# Patient Record
Sex: FEMALE | Race: WHITE | NOT HISPANIC OR LATINO | Employment: OTHER | URBAN - METROPOLITAN AREA
[De-identification: names, ages, dates, MRNs, and addresses within clinical notes are randomized per-mention and may not be internally consistent; named-entity substitution may affect disease eponyms.]

---

## 2021-05-24 ENCOUNTER — PRENATAL OFFICE VISIT (OUTPATIENT)
Dept: NURSING | Facility: CLINIC | Age: 31
End: 2021-05-24

## 2021-05-24 VITALS — HEIGHT: 63 IN

## 2021-05-24 DIAGNOSIS — Z34.90 SUPERVISION OF NORMAL PREGNANCY: Primary | ICD-10-CM

## 2021-05-24 PROCEDURE — 99207 PR NO CHARGE NURSE ONLY: CPT

## 2021-05-24 RX ORDER — PRENATAL VIT/IRON FUM/FOLIC AC 27MG-0.8MG
1 TABLET ORAL DAILY
COMMUNITY

## 2021-05-24 SDOH — HEALTH STABILITY: MENTAL HEALTH: HOW OFTEN DO YOU HAVE A DRINK CONTAINING ALCOHOL?: NOT ASKED

## 2021-05-24 SDOH — ECONOMIC STABILITY: TRANSPORTATION INSECURITY
IN THE PAST 12 MONTHS, HAS THE LACK OF TRANSPORTATION KEPT YOU FROM MEDICAL APPOINTMENTS OR FROM GETTING MEDICATIONS?: NO

## 2021-05-24 SDOH — ECONOMIC STABILITY: FOOD INSECURITY: WITHIN THE PAST 12 MONTHS, YOU WORRIED THAT YOUR FOOD WOULD RUN OUT BEFORE YOU GOT MONEY TO BUY MORE.: NEVER TRUE

## 2021-05-24 SDOH — ECONOMIC STABILITY: TRANSPORTATION INSECURITY
IN THE PAST 12 MONTHS, HAS LACK OF TRANSPORTATION KEPT YOU FROM MEETINGS, WORK, OR FROM GETTING THINGS NEEDED FOR DAILY LIVING?: NO

## 2021-05-24 SDOH — HEALTH STABILITY: MENTAL HEALTH: HOW OFTEN DO YOU HAVE 6 OR MORE DRINKS ON ONE OCCASION?: NOT ASKED

## 2021-05-24 SDOH — ECONOMIC STABILITY: INCOME INSECURITY: HOW HARD IS IT FOR YOU TO PAY FOR THE VERY BASICS LIKE FOOD, HOUSING, MEDICAL CARE, AND HEATING?: NOT HARD AT ALL

## 2021-05-24 SDOH — ECONOMIC STABILITY: FOOD INSECURITY: WITHIN THE PAST 12 MONTHS, THE FOOD YOU BOUGHT JUST DIDN'T LAST AND YOU DIDN'T HAVE MONEY TO GET MORE.: NEVER TRUE

## 2021-05-24 SDOH — HEALTH STABILITY: MENTAL HEALTH: HOW MANY STANDARD DRINKS CONTAINING ALCOHOL DO YOU HAVE ON A TYPICAL DAY?: NOT ASKED

## 2021-05-24 NOTE — PROGRESS NOTES
NPN nurse visit done over the phone. Pt will be given NPN folder and book at her upcoming appt.   Discussed optional screening available to assess chromosomal anomalies. Questions answered. Pt advised to call the clinic if she has any questions or concerns related to her pregnancy. Prenatal labs will be obtained at her upcoming appt. New prenatal visit scheduled on 6/21/21 with Dr Brantley..    Attempted to reschedule pt to an  appt sooner, but was unable to get her in on a Monday per her preference.  13w1d by LMP  Records from previous prenatal care in [facility] through [facility].  Last PAP 2017 per [facility]    Patient supplied answers from flow sheet for:  Prenatal OB Questionnaire.  Past Medical History  Have you ever recieved care for your mental health? : No  Have you ever been in a major accident or suffered serious trauma?: No  Within the last year, has anyone hit, slapped, kicked or otherwise hurt you?: No  In the last year, has anyone forced you to have sex when you didn't want to?: No    Past Medical History 2   Have you ever received a blood transfusion?: No  Would you accept a blood transfusion if was medically recommended?: Yes  Does anyone in your home smoke?: (!) Yes(Outside the home)   Is your blood type Rh negative?: Unknown  Have you ever ?: (!) Yes  Have you been hospitalized for a nonsurgical reason excluding normal delivery?: (!) Yes(Siezures as a baby)  Have you ever had an abnormal pap smear?: No    Past Medical History (Continued)  Do you have a history of abnormalities of the uterus?: Unknown  Did your mother take AMY or any other hormones when she was pregnant with you?: No  Do you have any other problems we have not asked about which you feel may be important to this pregnancy?: No    Maggy Gonzalez RN

## 2021-06-13 ENCOUNTER — TELEPHONE (OUTPATIENT)
Dept: OBGYN | Facility: CLINIC | Age: 31
End: 2021-06-13

## 2021-06-13 ENCOUNTER — NURSE TRIAGE (OUTPATIENT)
Dept: NURSING | Facility: CLINIC | Age: 31
End: 2021-06-13

## 2021-06-13 ENCOUNTER — HOSPITAL ENCOUNTER (OUTPATIENT)
Facility: CLINIC | Age: 31
Discharge: HOME | End: 2021-06-13
Attending: FAMILY MEDICINE | Admitting: FAMILY MEDICINE

## 2021-06-13 ENCOUNTER — APPOINTMENT (OUTPATIENT)
Dept: ULTRASOUND IMAGING | Facility: CLINIC | Age: 31
End: 2021-06-13
Attending: FAMILY MEDICINE

## 2021-06-13 VITALS
DIASTOLIC BLOOD PRESSURE: 79 MMHG | SYSTOLIC BLOOD PRESSURE: 121 MMHG | OXYGEN SATURATION: 96 % | TEMPERATURE: 98.8 F | RESPIRATION RATE: 18 BRPM

## 2021-06-13 DIAGNOSIS — N39.0 URINARY TRACT INFECTION: Primary | ICD-10-CM

## 2021-06-13 PROBLEM — Z36.89 ENCOUNTER FOR TRIAGE IN PREGNANT PATIENT: Status: ACTIVE | Noted: 2021-06-13

## 2021-06-13 LAB
ALBUMIN UR-MCNC: 70 MG/DL
APPEARANCE UR: ABNORMAL
BACTERIA #/AREA URNS HPF: ABNORMAL /HPF
BILIRUB UR QL STRIP: NEGATIVE
COLOR UR AUTO: ABNORMAL
GLUCOSE UR STRIP-MCNC: NEGATIVE MG/DL
HGB UR QL STRIP: ABNORMAL
KETONES UR STRIP-MCNC: 10 MG/DL
LEUKOCYTE ESTERASE UR QL STRIP: ABNORMAL
MUCOUS THREADS #/AREA URNS LPF: PRESENT /LPF
NITRATE UR QL: NEGATIVE
PH UR STRIP: 5.5 PH (ref 5–7)
RBC #/AREA URNS AUTO: >182 /HPF (ref 0–2)
SOURCE: ABNORMAL
SP GR UR STRIP: 1.03 (ref 1–1.03)
SPECIMEN SOURCE: NORMAL
SQUAMOUS #/AREA URNS AUTO: 7 /HPF (ref 0–1)
UROBILINOGEN UR STRIP-MCNC: NORMAL MG/DL (ref 0–2)
WBC #/AREA URNS AUTO: >182 /HPF (ref 0–5)
WET PREP SPEC: NORMAL

## 2021-06-13 PROCEDURE — 87210 SMEAR WET MOUNT SALINE/INK: CPT | Performed by: FAMILY MEDICINE

## 2021-06-13 PROCEDURE — 81001 URINALYSIS AUTO W/SCOPE: CPT | Performed by: FAMILY MEDICINE

## 2021-06-13 PROCEDURE — 250N000011 HC RX IP 250 OP 636: Performed by: FAMILY MEDICINE

## 2021-06-13 PROCEDURE — 76805 OB US >/= 14 WKS SNGL FETUS: CPT

## 2021-06-13 PROCEDURE — 99203 OFFICE O/P NEW LOW 30 MIN: CPT | Performed by: FAMILY MEDICINE

## 2021-06-13 PROCEDURE — 96372 THER/PROPH/DIAG INJ SC/IM: CPT | Performed by: FAMILY MEDICINE

## 2021-06-13 PROCEDURE — 87086 URINE CULTURE/COLONY COUNT: CPT | Performed by: FAMILY MEDICINE

## 2021-06-13 PROCEDURE — G0463 HOSPITAL OUTPT CLINIC VISIT: HCPCS | Mod: 25

## 2021-06-13 RX ORDER — ACETAMINOPHEN 325 MG/1
650 TABLET ORAL EVERY 4 HOURS PRN
Status: CANCELLED | OUTPATIENT
Start: 2021-06-13

## 2021-06-13 RX ORDER — HYDROMORPHONE HYDROCHLORIDE 1 MG/ML
.3-.5 INJECTION, SOLUTION INTRAMUSCULAR; INTRAVENOUS; SUBCUTANEOUS
Status: CANCELLED | OUTPATIENT
Start: 2021-06-13

## 2021-06-13 RX ORDER — NITROFURANTOIN 25; 75 MG/1; MG/1
100 CAPSULE ORAL 2 TIMES DAILY
Qty: 14 CAPSULE | Refills: 0 | Status: SHIPPED | OUTPATIENT
Start: 2021-06-13 | End: 2021-06-20

## 2021-06-13 RX ORDER — DIPHENOXYLATE HCL/ATROPINE 2.5-.025MG
2 TABLET ORAL EVERY 4 HOURS PRN
Status: CANCELLED | OUTPATIENT
Start: 2021-06-13

## 2021-06-13 RX ORDER — MISOPROSTOL 200 UG/1
400 TABLET ORAL EVERY 4 HOURS PRN
Status: CANCELLED | OUTPATIENT
Start: 2021-06-13

## 2021-06-13 RX ORDER — ONDANSETRON 2 MG/ML
4 INJECTION INTRAMUSCULAR; INTRAVENOUS EVERY 6 HOURS PRN
Status: CANCELLED | OUTPATIENT
Start: 2021-06-13

## 2021-06-13 RX ORDER — DIPHENOXYLATE HCL/ATROPINE 2.5-.025MG
2 TABLET ORAL ONCE
Status: CANCELLED | OUTPATIENT
Start: 2021-06-13 | End: 2021-06-13

## 2021-06-13 RX ORDER — MISOPROSTOL 200 UG/1
600 TABLET ORAL ONCE
Status: CANCELLED | OUTPATIENT
Start: 2021-06-13 | End: 2021-06-13

## 2021-06-13 RX ADMIN — HUMAN RHO(D) IMMUNE GLOBULIN 300 MCG: 300 INJECTION, SOLUTION INTRAMUSCULAR at 22:58

## 2021-06-14 LAB
BACTERIA SPEC CULT: NORMAL
Lab: NORMAL
SPECIMEN SOURCE: NORMAL

## 2021-06-14 NOTE — PROVIDER NOTIFICATION
06/13/21 2025   Provider Notification   Provider Name/Title Dr. Barros   Method of Notification Phone   Request Evaluate - Remote   Notification Reason Patient Arrived;Bleeding   Discuss with MD triage information (see previous note). Doptones done with HR of 120s-134. Pt is also tachycardic so placed pulse ox and palpated radial pulse while listening with doptone. Difficult to access if picking up maternal HR vs fetal HR.    MD orders wet prep, UA, and US.

## 2021-06-14 NOTE — PLAN OF CARE
Data: Patient presented to [facility]: 2021  8:10 PM.  Reason for maternal/fetal assessment is vaginal bleeding. Patient reports going to bathroom around 1900 and noticing mucus with red blood in her underwear and when she wiped. None in the toilet. Denies further bleeding at this time. Reports lower bilat abd soreness for weeks that increases with walking a lot. Denies cramping. Patient is a .  Prenatal record reviewed. Pregnancy has been uncomplicated.  Gestational Age 16w0d. VSS. Fetal movement pt states she thinks she can feel baby occasionally. Patient denies uterine contractions, leaking of vaginal fluid/rupture of membranes, abdominal pain, pelvic pressure, nausea, vomiting, headache, visual disturbances, epigastric or URQ pain, significant edema. Denies recent sex/anything vaginal. Denies falls, trauma to stomach. Support person is not present - FOB is a  who's currently in [place].  Action: Verbal consent for EFM. Triage assessment completed. Bill of rights reviewed.  Response: Patient verbalized agreement with plan. Will contact Dr Shana Barros with update and for further orders.

## 2021-06-14 NOTE — PLAN OF CARE
Patient assessed in the [facility] for vaginal bleeding. Per US no fetal activity noted - opting for induction on Friday. Discharge instructions and induction time reviewed. Orders to discharge home per Shana Barros.  Patient verbalized understanding of education and verbalized agreement with plan. Pt reports she is coping well with the information of a miscarriage. States she has had a miscarriage before and that this was a surprise baby that they weren't expecting. Pt indicates she had just accepted the pregnancy and was happy about it. Discussed normalcy of multiple emotions and to be reaching out to her support system as needed. Call clinic if concerns - pt verbalized and agreed. Discharged to home at 2305 via ride from brother-in-law.

## 2021-06-14 NOTE — TELEPHONE ENCOUNTER
"Patient calling - says she is 15 weeks pregnant.  Is scheduled for OB appointment next Monday.  Today she is having lower abdominal pain and vaginal bleeding.  Symptoms started about 2 hours ago.  Says the vaginal bleeding is moderate \"like a regular period.\"    Triaged to disposition of Go to ED Now.    Miriam Awad RN  Triage Nurse Advisor    COVID 19 Nurse Triage Plan/Patient Instructions    Please be aware that novel coronavirus (COVID-19) may be circulating in the community. If you develop symptoms such as fever, cough, or SOB or if you have concerns about the presence of another infection including coronavirus (COVID-19), please contact your health care provider or visit [email or web address].     Disposition/Instructions    ED Visit recommended. Follow protocol based instructions.     Bring Your Own Device:  Please also bring your smart device(s) (smart phones, tablets, laptops) and their charging cables for your personal use and to communicate with your care team during your visit.    Thank you for taking steps to prevent the spread of this virus.  o Limit your contact with others.  o Wear a simple mask to cover your cough.  o Wash your hands well and often.    Resources    [facility]: About COVID-19: [email or web address]    CDC: What to Do If You're Sick: www.cdc.gov/coronavirus/2019-ncov/about/steps-when-sick.html    CDC: Ending Home Isolation: www.cdc.gov/coronavirus/2019-ncov/hcp/disposition-in-home-patients.html     CDC: Caring for Someone: www.cdc.gov/coronavirus/2019-ncov/if-you-are-sick/care-for-someone.html     [facility]: Interim Guidance for Hospital Discharge to Home: www.health.[facility].[place].us/diseases/coronavirus/hcp/hospdischarge.pdf    [facility] clinical trials (COVID-19 research studies): [email or web address].[email or web address]/umn-clinical-trials     Below are the COVID-19 hotlines at the [place] Department of Health ([facility]). Interpreters are available.   o For health " questions: Call 407-672-1244 or 1-991.733.4710 (7 a.m. to 7 p.m.)  o For questions about schools and childcare: Call 549-185-2066 or 1-179.351.5226 (7 a.m. to 7 p.m.)     Reason for Disposition    [1] MODERATE vaginal bleeding (i.e., soaking 1 pad / hour; clots) AND [2] pregnant > 12 weeks    Additional Information    Negative: Shock suspected (e.g., cold/pale/clammy skin, too weak to stand, low BP, rapid pulse)    Negative: Difficult to awaken or acting confused (e.g., disoriented, slurred speech)    Negative: Passed out (i.e., lost consciousness, collapsed and was not responding)    Negative: Sounds like a life-threatening emergency to the triager    Negative: [1] Vaginal bleeding AND [2] pregnant > 20 weeks    Negative: Not pregnant or pregnancy status unknown    Negative: SEVERE abdominal pain    Negative: [1] SEVERE vaginal bleeding (e.g., soaking 2 pads / hour, large blood clots) AND [2] present 2 or more hours    Negative: SEVERE dizziness (e.g., unable to stand, requires support to walk, feels like passing out)    Negative: [1] MODERATE vaginal bleeding (i.e., soaking 1 pad / hour; clots) AND [2] present > 6 hours    Protocols used: PREGNANCY - VAGINAL BLEEDING LESS THAN 20 WEEKS [facility]

## 2021-06-14 NOTE — DISCHARGE INSTRUCTIONS
- Induction scheduled for Friday the 18th at 3PM. Call [phone] an hour before to verify timing.  - Take temperature daily. Monitor bleeding - come if filling a pad an hour or more OR if large clots noted.  - Urinary Tract Infection - antibiotic sent to [facility] in [place].  - Rhogam given 06/13/21.      Discharge Instructions for Miscarriage   You have had a miscarriage. This is the unplanned end of a pregnancy before the baby can live outside the uterus. You may have had a shock to your system, both physically and emotionally. Because of this, you may not feel well for a few days. Your body is going through changes. And you can expect mood swings. When you are ready, start back to your normal routine.  Home care  Suggestions for care at home include:    Return to work or your daily routines when you feel ready. This might be right away, or you may want to wait a few days.    Take showers instead of tub baths. This helps prevent infection. Ask your healthcare provider when you can take baths again.    Don't do any strenuous exercise right away, such as aerobics or running. Wait until the bleeding slows to the rate of a normal period.    Don t have sex or use tampons or douches until your provider says it s OK.    Get emotional support. Ask your provider about support groups in your area. Many women find it helpful to talk with other women who have had a miscarriage.  Follow-up  Make a follow-up appointment with your healthcare provider.  When to call your healthcare provider  Call your healthcare provider right away if you have any of the following:    Fever above 100.4 F (38 C) or higher, or as advised by your provider    Chills    Bright red vaginal bleeding or a smelly discharge    Vaginal bleeding that soaks more than 1 menstrual pad per hour    Belly pain that is severe or getting worse  [employer] last reviewed this educational content on 6/1/2019 2000-2021 [employer]. All rights reserved.  This information is not intended as a substitute for professional medical care. Always follow your healthcare professional's instructions.

## 2021-06-14 NOTE — PROVIDER NOTIFICATION
06/13/21 2236   Provider Notification   Provider Name/Title Dr. Barros   Method of Notification In Department   Notification Reason Other (Comment)   Discuss per care everywhere pt's blood type Aneg.     MD orders Rhogam at this time and then let pt know she will get another dose after she's delivered.

## 2021-06-14 NOTE — PROVIDER NOTIFICATION
06/13/21 7123   Provider Notification   Provider Name/Title Dr. Barros   Method of Notification At Bedside   Request Evaluate in Person   Notification Reason Other (Comment)   MD discusses US results with pt and FOB (via phone as he is a  currently in [place]). No fetal heart activity noted on US and fetus measuring around 14wks. Relay to MD that urine sample had medardo blood and wet prep was had blood as well. Small amount of medardo blood on pad.    MD discusses option of D&E vs inducing for labor. After pt discusses with FOB decide to do induction on Friday afternoon.

## 2021-06-14 NOTE — TELEPHONE ENCOUNTER
Alphonse, this patient came in with IUFD @ 14 2/7 weeks and wanted to come back for IOL on Friday when you are on call. I talked to her about IOL and D & E.  She prefers IOL and has had a misoprostol IOL for a 11 week that went as planned.      Dr. Brantley, you  Were going to see her so I wanted to let you know what is going on.  She may see you 6/21 post delivery

## 2021-06-14 NOTE — H&P
[facility] Labor and Delivery Triage Note    Sonya Aguilar MRN# [id number]   Age: 31 year old YOB: 1990     Date of Admission:  2021    Primary care provider: No Ref-Primary, Physician           Chief Complaint:   Sonya Aguilar is a 31 year old female who is  @ 16w0d pregnant and being seen for intrauterine fetal demise, measuring 14 weeks 2 days gestation.  Hx of 11 week Missed , in between two term vaginal delivery.           Pregnancy history:     OBSTETRIC HISTORY:    OB History    Para Term  AB Living   4 2 2 0 1 2   SAB TAB Ectopic Multiple Live Births   1 0 0 0 2      # Outcome Date GA Lbr Cecilio/2nd Weight Sex Delivery Anes PTL Lv   4 Current            3 Term 10/16/19 38w5d  2.7 kg (5 lb 15.2 oz) F Vag-Spont   ZAC      Birth Comments: thick mec->hypoxia->NICU->cooling      Name: BG SONYA AGUILAR      Apgar1: 7  Apgar5: 9   2 SAB 18           1 Term 09/10/15 41w1d   M Vag-Spont EPI  ZAC      Name: Trent WILLS       EDC: Estimated Date of Delivery: 2021    Prenatal Labs: No results found for: ABO, RH, AS, HEPBANG, CHPCRT, GCPCRT, TREPAB, RUBELLAABIGG, HGB, HIV    GBS Status:   No results found for: GBS    Active Problem List  Patient Active Problem List   Diagnosis     Encounter for triage in pregnant patient       Medication Prior to Admission  Medications Prior to Admission   Medication Sig Dispense Refill Last Dose     Prenatal Vit-Fe Fumarate-FA (PRENATAL MULTIVITAMIN W/IRON) 27-0.8 MG tablet Take 1 tablet by mouth daily      .        Maternal Past Medical History:     Past Medical History:   Diagnosis Date     Seizures in the      At 10 months                       Family History:   This patient has no significant family history            Social History:   This patient has no significant social history         Review of Systems:   CONSTITUTIONAL: NEGATIVE for fever, chills, change in weight  INTEGUMENTARY/SKIN:  NEGATIVE for worrisome rashes, moles or lesions  EYES: NEGATIVE for vision changes or irritation  ENT/MOUTH: NEGATIVE for ear, mouth and throat problems  RESP: NEGATIVE for significant cough or SOB  BREAST: NEGATIVE for masses, tenderness or discharge  CV: NEGATIVE for chest pain, palpitations or peripheral edema  GI: NEGATIVE for nausea, abdominal pain, heartburn, or change in bowel habits  : NEGATIVE for frequency, dysuria, or hematuria  MUSCULOSKELETAL: NEGATIVE for significant arthralgias or myalgia  NEURO: NEGATIVE for weakness, dizziness or paresthesias  ENDOCRINE: NEGATIVE for temperature intolerance, skin/hair changes  HEME: NEGATIVE for bleeding problems  PSYCHIATRIC: NEGATIVE for changes in mood or affect          Physical Exam:     Vitals were reviewed  All vitals stable  Patient Vitals for the past 8 hrs:   BP Temp Temp src Resp SpO2   06/13/21 2205 -- 98.8  F (37.1  C) Oral -- --   06/13/21 2016 121/79 99.8  F (37.7  C) Oral 18 96 %     Constitutional: Awake, alert, cooperative, no apparent distress, and appears stated age.    EXAM: US OB > 14 WEEKS  LOCATION: [facility]  DATE/TIME: 6/13/2021 9:00 PM     INDICATION: Pregnant with bleeding.  COMPARISON: None.  TECHNIQUE: Routine.     FINDINGS: Single intrauterine gestation, cephalic presentation. Placenta is located posterior. Amniotic fluid is abnormal subjectively, not measured. Uterus is normal. Maternal adnexa (right and left ovaries) show no abnormalities.     FETAL ANOMALY SCREEN: Not performed.       BIOMETRY:  Biparietal Diameter: 2.29 cm, 13 weeks, 6 days  Head Circumference: 9.15 cm, 14 weeks, 1 day  Abdominal Circumference: 8.2 cm, 14 weeks, 4 days  Femur Length: 1.48 cm, 14 weeks, 3 days     Fetal Heart Rate: No cardiac activity identified.     EDC by This US exam: 12/10/2021     Composite Age by This US: 14 weeks, 2 days                                                                      IMPRESSION:    1.  Single nonviable  intrauterine gestation.  2.  Based on this ultrasound, composite age of 14 weeks 2 days with EDC 12/10/2021.  3.  Clinical EDC 2021        Assessment:   Kayla Loya is a 31 year old female who is  @ 16w0d pregnant and being seen for intrauterine fetal demise, measuring 14 weeks 2 days gestation.  Hx of 11 week Missed , in between two term vaginal delivery.         Plan:   Fetal demise, 14 2/7 weeks gestation:     Discussed options of induction of labor or dilation and evacuation due to > 14 weeks gestation.   She desires IOL and would like to be discharged home and return on Friday since her    Is out of town until then.  This will be arranged.  Discussed risks, benefits and alternatives.   Discussed testing her and baby and she declines this today as she is not thinking she will pursue pregnancy in the future.   I advised that we will discuss again upon admission and she will consider this.      UTI:  rx macrobid     Rh negative:  Had some bleeding today, will give rhogam now and plan for repeat dose on Friday after delivery.      Labs were reviewed in [facility]   Imaging was reviewed in Epic   Tests and documents were reviewed.   Discussion of management or test interpretation       15 minutes spent on the date of the encounter doing chart review, review of test results, interpretation of tests, patient visit, documentation and discussion with family ( on facetime during visit).           Shana Barros,

## 2021-06-18 ENCOUNTER — HOSPITAL ENCOUNTER (INPATIENT)
Facility: CLINIC | Age: 31
LOS: 1 days | Discharge: HOME | DRG: 779 | End: 2021-06-19
Attending: OBSTETRICS & GYNECOLOGY | Admitting: OBSTETRICS & GYNECOLOGY

## 2021-06-18 DIAGNOSIS — O02.1 MISSED ABORTION WITH FETAL DEMISE BEFORE 20 COMPLETED WEEKS OF GESTATION: Primary | ICD-10-CM

## 2021-06-18 LAB
ABO + RH BLD: ABNORMAL
ABO + RH BLD: ABNORMAL
AMPHETAMINES UR QL SCN: NEGATIVE
BLD GP AB INVEST PLASRBC-IMP: ABNORMAL
BLD GP AB SCN SERPL QL: ABNORMAL
BLOOD BANK CMNT PATIENT-IMP: ABNORMAL
CANNABINOIDS UR QL: NEGATIVE
COCAINE UR QL: NEGATIVE
ERYTHROCYTE [DISTWIDTH] IN BLOOD BY AUTOMATED COUNT: 13 % (ref 10–15)
FIBRINOGEN PPP-MCNC: 266 MG/DL (ref 200–420)
HCT VFR BLD AUTO: 41.2 % (ref 35–47)
HGB BLD-MCNC: 13.5 G/DL (ref 11.7–15.7)
HGB F BLD QL KLEIH BETKE: NORMAL
LABORATORY COMMENT REPORT: NORMAL
MCH RBC QN AUTO: 29 PG (ref 26.5–33)
MCHC RBC AUTO-ENTMCNC: 32.8 G/DL (ref 31.5–36.5)
MCV RBC AUTO: 88 FL (ref 78–100)
OPIATES UR QL SCN: NEGATIVE
PCP UR QL SCN: NEGATIVE
PLATELET # BLD AUTO: 201 10E9/L (ref 150–450)
RBC # BLD AUTO: 4.66 10E12/L (ref 3.8–5.2)
SARS-COV-2 RNA RESP QL NAA+PROBE: NEGATIVE
SPECIMEN EXP DATE BLD: ABNORMAL
SPECIMEN SOURCE: NORMAL
WBC # BLD AUTO: 7.7 10E9/L (ref 4–11)

## 2021-06-18 PROCEDURE — 86901 BLOOD TYPING SEROLOGIC RH(D): CPT | Performed by: OBSTETRICS & GYNECOLOGY

## 2021-06-18 PROCEDURE — 10J17ZZ INSPECTION OF PRODUCTS OF CONCEPTION, RETAINED, VIA NATURAL OR ARTIFICIAL OPENING: ICD-10-PCS | Performed by: OBSTETRICS & GYNECOLOGY

## 2021-06-18 PROCEDURE — 99232 SBSQ HOSP IP/OBS MODERATE 35: CPT | Performed by: OBSTETRICS & GYNECOLOGY

## 2021-06-18 PROCEDURE — 87635 SARS-COV-2 COVID-19 AMP PRB: CPT | Performed by: OBSTETRICS & GYNECOLOGY

## 2021-06-18 PROCEDURE — 86870 RBC ANTIBODY IDENTIFICATION: CPT | Performed by: OBSTETRICS & GYNECOLOGY

## 2021-06-18 PROCEDURE — 80307 DRUG TEST PRSMV CHEM ANLYZR: CPT | Performed by: OBSTETRICS & GYNECOLOGY

## 2021-06-18 PROCEDURE — 85384 FIBRINOGEN ACTIVITY: CPT | Performed by: OBSTETRICS & GYNECOLOGY

## 2021-06-18 PROCEDURE — 86780 TREPONEMA PALLIDUM: CPT | Performed by: OBSTETRICS & GYNECOLOGY

## 2021-06-18 PROCEDURE — 36415 COLL VENOUS BLD VENIPUNCTURE: CPT | Performed by: OBSTETRICS & GYNECOLOGY

## 2021-06-18 PROCEDURE — 250N000013 HC RX MED GY IP 250 OP 250 PS 637: Performed by: OBSTETRICS & GYNECOLOGY

## 2021-06-18 PROCEDURE — 86900 BLOOD TYPING SEROLOGIC ABO: CPT | Performed by: OBSTETRICS & GYNECOLOGY

## 2021-06-18 PROCEDURE — 85027 COMPLETE CBC AUTOMATED: CPT | Performed by: OBSTETRICS & GYNECOLOGY

## 2021-06-18 PROCEDURE — 120N000001 HC R&B MED SURG/OB

## 2021-06-18 PROCEDURE — 86850 RBC ANTIBODY SCREEN: CPT | Performed by: OBSTETRICS & GYNECOLOGY

## 2021-06-18 PROCEDURE — 85460 HEMOGLOBIN FETAL: CPT | Performed by: OBSTETRICS & GYNECOLOGY

## 2021-06-18 RX ORDER — DIPHENOXYLATE HCL/ATROPINE 2.5-.025MG
2 TABLET ORAL ONCE
Status: CANCELLED | OUTPATIENT
Start: 2021-06-18 | End: 2021-06-18

## 2021-06-18 RX ORDER — NALBUPHINE HYDROCHLORIDE 10 MG/ML
2.5-5 INJECTION, SOLUTION INTRAMUSCULAR; INTRAVENOUS; SUBCUTANEOUS EVERY 6 HOURS PRN
Status: DISCONTINUED | OUTPATIENT
Start: 2021-06-18 | End: 2021-06-20 | Stop reason: HOSPADM

## 2021-06-18 RX ORDER — TRANEXAMIC ACID 10 MG/ML
INJECTION, SOLUTION INTRAVENOUS
Status: DISPENSED
Start: 2021-06-18 | End: 2021-06-19

## 2021-06-18 RX ORDER — LIDOCAINE HYDROCHLORIDE 10 MG/ML
INJECTION, SOLUTION EPIDURAL; INFILTRATION; INTRACAUDAL; PERINEURAL
Status: DISPENSED
Start: 2021-06-18 | End: 2021-06-19

## 2021-06-18 RX ORDER — SODIUM CHLORIDE, SODIUM LACTATE, POTASSIUM CHLORIDE, CALCIUM CHLORIDE 600; 310; 30; 20 MG/100ML; MG/100ML; MG/100ML; MG/100ML
INJECTION, SOLUTION INTRAVENOUS CONTINUOUS
Status: DISCONTINUED | OUTPATIENT
Start: 2021-06-18 | End: 2021-06-20 | Stop reason: HOSPADM

## 2021-06-18 RX ORDER — NALOXONE HYDROCHLORIDE 0.4 MG/ML
0.4 INJECTION, SOLUTION INTRAMUSCULAR; INTRAVENOUS; SUBCUTANEOUS
Status: DISCONTINUED | OUTPATIENT
Start: 2021-06-18 | End: 2021-06-20 | Stop reason: HOSPADM

## 2021-06-18 RX ORDER — MISOPROSTOL 200 UG/1
600 TABLET ORAL ONCE
Status: COMPLETED | OUTPATIENT
Start: 2021-06-18 | End: 2021-06-18

## 2021-06-18 RX ORDER — MISOPROSTOL 200 UG/1
400 TABLET ORAL EVERY 4 HOURS PRN
Status: CANCELLED | OUTPATIENT
Start: 2021-06-18

## 2021-06-18 RX ORDER — HYDROMORPHONE HYDROCHLORIDE 1 MG/ML
.3-.5 INJECTION, SOLUTION INTRAMUSCULAR; INTRAVENOUS; SUBCUTANEOUS
Status: DISCONTINUED | OUTPATIENT
Start: 2021-06-18 | End: 2021-06-20 | Stop reason: HOSPADM

## 2021-06-18 RX ORDER — ACETAMINOPHEN 325 MG/1
650 TABLET ORAL EVERY 4 HOURS PRN
Status: DISCONTINUED | OUTPATIENT
Start: 2021-06-18 | End: 2021-06-20 | Stop reason: HOSPADM

## 2021-06-18 RX ORDER — ACETAMINOPHEN 325 MG/1
650 TABLET ORAL EVERY 4 HOURS PRN
Status: CANCELLED | OUTPATIENT
Start: 2021-06-18

## 2021-06-18 RX ORDER — EPHEDRINE SULFATE 50 MG/ML
5 INJECTION, SOLUTION INTRAMUSCULAR; INTRAVENOUS; SUBCUTANEOUS
Status: DISCONTINUED | OUTPATIENT
Start: 2021-06-18 | End: 2021-06-20 | Stop reason: HOSPADM

## 2021-06-18 RX ORDER — NALOXONE HYDROCHLORIDE 0.4 MG/ML
0.2 INJECTION, SOLUTION INTRAMUSCULAR; INTRAVENOUS; SUBCUTANEOUS
Status: DISCONTINUED | OUTPATIENT
Start: 2021-06-18 | End: 2021-06-20 | Stop reason: HOSPADM

## 2021-06-18 RX ORDER — MISOPROSTOL 200 UG/1
400 TABLET ORAL EVERY 4 HOURS PRN
Status: DISCONTINUED | OUTPATIENT
Start: 2021-06-18 | End: 2021-06-20 | Stop reason: HOSPADM

## 2021-06-18 RX ORDER — ONDANSETRON 2 MG/ML
4 INJECTION INTRAMUSCULAR; INTRAVENOUS EVERY 6 HOURS PRN
Status: DISCONTINUED | OUTPATIENT
Start: 2021-06-18 | End: 2021-06-20 | Stop reason: HOSPADM

## 2021-06-18 RX ORDER — ONDANSETRON 2 MG/ML
4 INJECTION INTRAMUSCULAR; INTRAVENOUS EVERY 6 HOURS PRN
Status: CANCELLED | OUTPATIENT
Start: 2021-06-18

## 2021-06-18 RX ORDER — ONDANSETRON 4 MG/1
4 TABLET, ORALLY DISINTEGRATING ORAL EVERY 6 HOURS PRN
Status: DISCONTINUED | OUTPATIENT
Start: 2021-06-18 | End: 2021-06-20 | Stop reason: HOSPADM

## 2021-06-18 RX ORDER — MISOPROSTOL 200 UG/1
600 TABLET ORAL ONCE
Status: CANCELLED | OUTPATIENT
Start: 2021-06-18 | End: 2021-06-18

## 2021-06-18 RX ORDER — DIPHENOXYLATE HCL/ATROPINE 2.5-.025MG
2 TABLET ORAL EVERY 4 HOURS PRN
Status: CANCELLED | OUTPATIENT
Start: 2021-06-18

## 2021-06-18 RX ORDER — MORPHINE SULFATE 4 MG/ML
1 INJECTION, SOLUTION INTRAMUSCULAR; INTRAVENOUS
Status: CANCELLED | OUTPATIENT
Start: 2021-06-18

## 2021-06-18 RX ADMIN — MISOPROSTOL 600 MCG: 200 TABLET ORAL at 17:40

## 2021-06-18 RX ADMIN — MISOPROSTOL 400 MCG: 200 TABLET ORAL at 22:00

## 2021-06-18 NOTE — H&P
"L&D History and Physical   2021  Sonya Loya  [id number]      HPI: Sonya Loya is a 31 year old  presents for scheduled induction of labor for a missed  at 14w2d.    She states that she feels well today.  She denies fever, HA, blurred vision, Nausea, vomiting, CP, SOB, abdominal pain, constipation, diarrhea, vaginal bleeding, LOF, abnormal vaginal discharge, and acute swelling.       Complications of Pregnancy:  -   Patient Active Problem List   Diagnosis     Encounter for triage in pregnant patient     Missed  with fetal demise before 20 completed weeks of gestation         OBHX:   OB History    Para Term  AB Living   4 2 2 0 1 2   SAB TAB Ectopic Multiple Live Births   1 0 0 0 2      # Outcome Date GA Lbr Cecilio/2nd Weight Sex Delivery Anes PTL Lv   4 Current            3 Term 10/16/19 38w5d  2.7 kg (5 lb 15.2 oz) F Vag-Spont   ZAC      Birth Comments: thick mec->hypoxia->NICU->cooling      Name: MELONIEMARTÍNEZBG SONYA      Apgar1: 7  Apgar5: 9   2 SAB 18           1 Term 09/10/15 41w1d   M Vag-Spont EPI  ZAC      Name: Trent WILLS       MedicalHX:   Past Medical History:   Diagnosis Date     Seizures in the      At 10 months       SurgicalHX:   Past Surgical History:   Procedure Laterality Date     NO HISTORY OF SURGERY         Medications:   No current facility-administered medications on file prior to encounter.   nitroFURantoin macrocrystal-monohydrate (MACROBID) 100 MG capsule, Take 1 capsule (100 mg) by mouth 2 times daily for 7 days  Prenatal Vit-Fe Fumarate-FA (PRENATAL MULTIVITAMIN W/IRON) 27-0.8 MG tablet, Take 1 tablet by mouth daily        Allergies:  Allergies   Allergen Reactions     Sulfa Drugs      \"This happened when I was a baby and never since.\"       FamilyHX:  Family History   Problem Relation Age of Onset     Hypertension Mother      Hypertension Father      Skin Cancer Paternal Grandfather        SocialHX:   Social History " "    Socioeconomic History     Marital status:      Spouse name: Trent     Number of children: 12     Years of education: None     Highest education level: None   Occupational History     None   Social Needs     Financial resource strain: Not hard at all     Food insecurity     Worry: Never true     Inability: Never true     Transportation needs     Medical: No     Non-medical: No   Tobacco Use     Smoking status: Former Smoker     Quit date:      Years since quittin.4     Smokeless tobacco: Never Used   Substance and Sexual Activity     Alcohol use: Not Currently     Drug use: Never     Sexual activity: Yes     Partners: Male     Comment: Pregnant   Lifestyle     Physical activity     Days per week: None     Minutes per session: None     Stress: None   Relationships     Social connections     Talks on phone: None     Gets together: None     Attends L[employer]-day service: None     Active member of club or organization: None     Attends meetings of clubs or organizations: None     Relationship status: None     Intimate partner violence     Fear of current or ex partner: None     Emotionally abused: None     Physically abused: None     Forced sexual activity: None   Other Topics Concern     None   Social History Narrative     None       ROS: 10-point ROS negative except as in HPI     Physical Exam:  Vitals:    21 1530   BP: 121/82   Pulse: 97   Resp: 18   Temp: 98.8  F (37.1  C)   TempSrc: Oral   Height: 1.6 m (5' 3\")     GEN: resting comfortably in bed, in NAD   CVS: RRR, no murmur appreciated   PULMONARY: CTAB, no increased work of breathing, no cough/wheeze   ABDOMEN: soft, gravid, non-tender, non-distended  EXTREMITIES: trace edema, non tender to palpation  CVX: closed/80/0  Presentation: cephalic by bedside ultrasound      Ultrasounds:  Reviewed in Epic charting     Labs:   No results found for this or any previous visit (from the past 24 hour(s)).    No results found for: ABO, RH, AS, HEPBANG, " CHPCRT, GCPCRT, TREPAB, RUBELLAABIGG, HGB, HIV    GBS Status:   No results found for: GBS    No results found for: PAP    A/P: Kayla Loya is a 31 year old female  presents for induction of labor for a missed  at 14w2d    Admit for: Induction of labor; will initiate misoprostol PO protocol for gestational age less than 22 weeks; labs collected include CBC, T&S, coagulation panel (establish baseline DIC labs)   Pain management: comfort care measures as needed; if epidural is desired, then will consult anesthesiologist for administration; appreciate coordination of care     Olga Simms MD  [facility]

## 2021-06-18 NOTE — PROVIDER NOTIFICATION
"   21 6935   Provider Notification   Provider Name/Title Dr. Simms   Method of Notification At Bedside   Request Evaluate in Person   Notification Reason Patient Arrived     MD at bedside to meet Kayla and discuss POC for induction of IUFD. Patient presents alone for her induction today; her  is at home watching her other children and is unsure if he plans to attend delivery. Patient is  at 16w 5d with a history of 2 term vaginal deliveries and 1 previous SAB at 11w. IUFD [facility], where she was assessed for vaginal bleeding and cramping. Patient states she feels \"ok for now\" about the loss and that she \"hasn't really thought about\" specifics of delivery, post mortem testing, plans for disposition of baby's body. RN will make a delivery plan with patient during her stay and report back to MD.      Uterus palpates soft, contractions not occurring, VSS, IV placed and saline locked. Scant vaginal bleeding noted, SVE 0/80/0. Per bedside ultrasound, baby is vertex. Order received to collect standard labs + fibrinogen activity, urine tox, covid swab. Patient may have any desired remedies for pain. Plan to begin induction with oral cytotec per protocol, RN will enter orders.   "

## 2021-06-19 ENCOUNTER — HOSPITAL ENCOUNTER (INPATIENT)
Facility: CLINIC | Age: 31
End: 2021-06-19
Admitting: OBSTETRICS & GYNECOLOGY

## 2021-06-19 VITALS
TEMPERATURE: 98.5 F | RESPIRATION RATE: 20 BRPM | SYSTOLIC BLOOD PRESSURE: 123 MMHG | HEART RATE: 97 BPM | DIASTOLIC BLOOD PRESSURE: 88 MMHG | HEIGHT: 63 IN

## 2021-06-19 DIAGNOSIS — O02.1 IUFD AT LESS THAN 20 WEEKS OF GESTATION: Primary | ICD-10-CM

## 2021-06-19 LAB — T PALLIDUM AB SER QL: NONREACTIVE

## 2021-06-19 PROCEDURE — 250N000013 HC RX MED GY IP 250 OP 250 PS 637: Performed by: OBSTETRICS & GYNECOLOGY

## 2021-06-19 PROCEDURE — 99238 HOSP IP/OBS DSCHRG MGMT 30/<: CPT | Performed by: OBSTETRICS & GYNECOLOGY

## 2021-06-19 RX ORDER — ACETAMINOPHEN 325 MG/1
650 TABLET ORAL EVERY 4 HOURS PRN
COMMUNITY
Start: 2021-06-19

## 2021-06-19 RX ADMIN — MISOPROSTOL 400 MCG: 200 TABLET ORAL at 06:06

## 2021-06-19 RX ADMIN — MISOPROSTOL 400 MCG: 200 TABLET ORAL at 02:11

## 2021-06-19 RX ADMIN — MISOPROSTOL 400 MCG: 200 TABLET ORAL at 10:03

## 2021-06-19 RX ADMIN — MISOPROSTOL 400 MCG: 200 TABLET ORAL at 14:20

## 2021-06-19 RX ADMIN — MISOPROSTOL 400 MCG: 200 TABLET ORAL at 18:40

## 2021-06-19 NOTE — PROGRESS NOTES
Vicente MENESES,  staff  have reviewed and edited with the following  student's note on 6/20/2021 at 11:49 AM.    SPIRITUAL HEALTH SERVICES Progress Note  [facility] Labor & Delivery    Met with pt per Spiritual Health Order. Pt had recently had a miscarriage. Oriented pt to Spiritual Health Services and offered various supportive practices, pt expressed that she was fine for now. Pt shared that  and two children were at home.    Offered pt condolences and informed her that Spiritual Health Services remain available for further consultation at her request.    Tree Montemayor   Intern  Pager [phone]

## 2021-06-19 NOTE — PROVIDER NOTIFICATION
"   06/18/21 2200   Provider Notification   Provider Name/Title Dr. Simms   Method of Notification In Department   Request Evaluate - Remote   Notification Reason Status Update;SVE     RN updated MD that patient is beginning to feel \"just a little crampy.\" [place] unable to trace ctx currently. Bright red bleeding noted on pad with small clots that do not appear to contain fetal body parts. SVE: 0.5/80/0. Second dose of cytotec administered. Will continue to monitor.   "

## 2021-06-19 NOTE — PROVIDER NOTIFICATION
06/19/21 1517   Provider Notification   Provider Name/Title Dr. Menendez   Method of Notification Phone   Request Evaluate - Remote   Notification Reason Status Update;SVE     RN updated MD that before last dose of cytotec, SVE was 0.5/80/0. Cervix is now anterior, RN can insert a full fingertip into the external os, but the internal os remains closed. Patient states that she is comfortable at this time, she feels intermittent cramps rated 1/10. Emotionally, she is coping as expected in her situation. RN is providing emotional support. MYRTLE STANTON is comfortable with this report. Order received to increase course of cytotec to total of 12 doses (400 mcg Q4H) and continue to assess cervix prior to each dose. RN will update MD with any changes/concerns.

## 2021-06-19 NOTE — PLAN OF CARE
Kayla arrived on unit for scheduled induction for IUFD, settled in room 410. RN will notify Dr. Simms and obtain orders.

## 2021-06-19 NOTE — PROVIDER NOTIFICATION
06/19/21 0219   Provider Notification   Provider Name/Title Dr. Simms   Method of Notification In Department   MD in department. Updated that latest dose of misoprostol given at 0200. Kayla is having cramping sensations that she feels about every 10 minutes, rates at 1/10 on the pain scale. On her peripad, there is a 10x1 cm area of dark brown/red blood. Kayla is not requesting anything for pain or comfort at this time.     VORB to recheck cervix before next dose; update MD on increased discomfort.

## 2021-06-19 NOTE — PROGRESS NOTES
"S:  Kayla Loya is a 31 year old female  presents for induction of labor for a missed  at 14w2d.  U/S exam from 21 shows the following    Single intrauterine gestation, cephalic presentation. Placenta is located posterior. Amniotic fluid is abnormal subjectively, not measured. Uterus is normal. Maternal adnexa (right and left ovaries) show no abnormalities.    O:  /71   Pulse 97   Temp 98.1  F (36.7  C) (Oral)   Resp 14   Ht 1.6 m (5' 3\")   LMP 2021 (Exact Date)   Constitutional: healthy, alert and no distress  Pt feeling mild camps  No bleeding      Lab data:    Component      Latest Ref Rng & Units 2021   WBC      4.0 - 11.0 10e9/L 7.7   RBC Count      3.8 - 5.2 10e12/L 4.66   Hemoglobin      11.7 - 15.7 g/dL 13.5   Hematocrit      35.0 - 47.0 % 41.2   MCV      78 - 100 fl 88   MCH      26.5 - 33.0 pg 29.0   MCHC      31.5 - 36.5 g/dL 32.8   RDW      10.0 - 15.0 % 13.0   Platelet Count      150 - 450 10e9/L 201   ABO       A   RH(D)       Neg   Antibody Screen       Pos (A)   Test Valid Only At       [facility]   Specimen Expires       2021   Antibody Identification       ANTI-D . . .   Amphetamine Qual Urine      NEG:Negative Negative   Cannabinoids Qual Urine      NEG:Negative Negative   Cocaine Qual Urine      NEG:Negative Negative   Opiates Qualitative Urine      NEG:Negative Negative   Pcp Qual Urine      NEG:Negative Negative   SARS-CoV-2 Virus Specimen Source       Nasopharyngeal   SARS-CoV-2 PCR Result       NEGATIVE   SARS-CoV-2 PCR Comment       (Note)   Saige-Ruthy       No fetal cells seen . . .   Fibrinogen      200 - 420 mg/dL 266     A:  IUP 14.2 weeks gestation with early 2nd trimester loss    P: pt undergoing IOL with cytotec  Plan rev with pt  She understands and accepts  Risks, benefits, and alternative modes of therapy discussed at length. Pathophysiology of the disease process reviewed, all of the patients questions " answered and informed consent obtained.

## 2021-06-19 NOTE — PROVIDER NOTIFICATION
06/19/21 0623   Provider Notification   Provider Name/Title Dr. Menendez   Method of Notification Phone   MD called unit for update; informed on most recent SVE, doses of misoprostol given, and patient comfort level and emotional coping overnight. MD will be in to round in AM, continue with course of misoprostol at this time.

## 2021-06-19 NOTE — PROVIDER NOTIFICATION
06/19/21 1850   Provider Notification   Provider Name/Title Dr. Menendez   Method of Notification Phone   Request Evaluate in Person   Notification Reason Other (Comment)     MD called RN to report that pharmacy contacted him regarding the efficacy of current medication plan. He is on his way to the hospital to assess the patient in person and form a new POC. RN will update patient.

## 2021-06-20 ENCOUNTER — HOSPITAL ENCOUNTER (OUTPATIENT)
Facility: CLINIC | Age: 31
Discharge: HOME | End: 2021-06-20
Attending: OBSTETRICS & GYNECOLOGY | Admitting: OBSTETRICS & GYNECOLOGY

## 2021-06-20 ENCOUNTER — APPOINTMENT (OUTPATIENT)
Dept: ULTRASOUND IMAGING | Facility: CLINIC | Age: 31
End: 2021-06-20
Attending: OBSTETRICS & GYNECOLOGY

## 2021-06-20 ENCOUNTER — ANESTHESIA EVENT (OUTPATIENT)
Dept: SURGERY | Facility: CLINIC | Age: 31
End: 2021-06-20

## 2021-06-20 ENCOUNTER — ANESTHESIA (OUTPATIENT)
Dept: SURGERY | Facility: CLINIC | Age: 31
End: 2021-06-20

## 2021-06-20 VITALS
BODY MASS INDEX: 32.41 KG/M2 | RESPIRATION RATE: 16 BRPM | TEMPERATURE: 98.4 F | SYSTOLIC BLOOD PRESSURE: 100 MMHG | HEART RATE: 100 BPM | DIASTOLIC BLOOD PRESSURE: 66 MMHG | WEIGHT: 182.98 LBS | OXYGEN SATURATION: 99 %

## 2021-06-20 DIAGNOSIS — O02.1 IUFD AT LESS THAN 20 WEEKS OF GESTATION: ICD-10-CM

## 2021-06-20 LAB
ABO + RH BLD: ABNORMAL
ABO + RH BLD: ABNORMAL
BLD GP AB INVEST PLASRBC-IMP: ABNORMAL
BLD GP AB SCN SERPL QL: ABNORMAL
BLOOD BANK CMNT PATIENT-IMP: ABNORMAL
BLOOD BANK CMNT PATIENT-IMP: ABNORMAL
SPECIMEN EXP DATE BLD: ABNORMAL

## 2021-06-20 PROCEDURE — 88305 TISSUE EXAM BY PATHOLOGIST: CPT | Mod: 26 | Performed by: PATHOLOGY

## 2021-06-20 PROCEDURE — 710N000012 HC RECOVERY PHASE 2, PER MINUTE: Performed by: OBSTETRICS & GYNECOLOGY

## 2021-06-20 PROCEDURE — 59812 TREATMENT OF MISCARRIAGE: CPT | Performed by: OBSTETRICS & GYNECOLOGY

## 2021-06-20 PROCEDURE — 250N000013 HC RX MED GY IP 250 OP 250 PS 637: Performed by: OBSTETRICS & GYNECOLOGY

## 2021-06-20 PROCEDURE — 88262 CHROMOSOME ANALYSIS 15-20: CPT | Mod: TC | Performed by: OBSTETRICS & GYNECOLOGY

## 2021-06-20 PROCEDURE — 360N000075 HC SURGERY LEVEL 2, PER MIN: Performed by: OBSTETRICS & GYNECOLOGY

## 2021-06-20 PROCEDURE — 250N000011 HC RX IP 250 OP 636: Performed by: NURSE ANESTHETIST, CERTIFIED REGISTERED

## 2021-06-20 PROCEDURE — 86901 BLOOD TYPING SEROLOGIC RH(D): CPT | Performed by: OBSTETRICS & GYNECOLOGY

## 2021-06-20 PROCEDURE — 86900 BLOOD TYPING SEROLOGIC ABO: CPT | Performed by: OBSTETRICS & GYNECOLOGY

## 2021-06-20 PROCEDURE — 76998 US GUIDE INTRAOP: CPT | Mod: 26 | Performed by: OBSTETRICS & GYNECOLOGY

## 2021-06-20 PROCEDURE — 86870 RBC ANTIBODY IDENTIFICATION: CPT | Performed by: OBSTETRICS & GYNECOLOGY

## 2021-06-20 PROCEDURE — 86850 RBC ANTIBODY SCREEN: CPT | Performed by: OBSTETRICS & GYNECOLOGY

## 2021-06-20 PROCEDURE — 272N000001 HC OR GENERAL SUPPLY STERILE: Performed by: OBSTETRICS & GYNECOLOGY

## 2021-06-20 PROCEDURE — 258N000003 HC RX IP 258 OP 636: Performed by: NURSE ANESTHETIST, CERTIFIED REGISTERED

## 2021-06-20 PROCEDURE — 999N001020 HC STATISTIC H-SEND OUTS PREP: Performed by: OBSTETRICS & GYNECOLOGY

## 2021-06-20 PROCEDURE — 76830 TRANSVAGINAL US NON-OB: CPT

## 2021-06-20 PROCEDURE — 76856 US EXAM PELVIC COMPLETE: CPT | Mod: 26 | Performed by: RADIOLOGY

## 2021-06-20 PROCEDURE — 370N000017 HC ANESTHESIA TECHNICAL FEE, PER MIN: Performed by: OBSTETRICS & GYNECOLOGY

## 2021-06-20 PROCEDURE — 88305 TISSUE EXAM BY PATHOLOGIST: CPT | Mod: TC | Performed by: OBSTETRICS & GYNECOLOGY

## 2021-06-20 PROCEDURE — 88233 TISSUE CULTURE SKIN/BIOPSY: CPT | Mod: TC | Performed by: OBSTETRICS & GYNECOLOGY

## 2021-06-20 PROCEDURE — 250N000009 HC RX 250: Performed by: OBSTETRICS & GYNECOLOGY

## 2021-06-20 PROCEDURE — 999N000141 HC STATISTIC PRE-PROCEDURE NURSING ASSESSMENT: Performed by: OBSTETRICS & GYNECOLOGY

## 2021-06-20 RX ORDER — ONDANSETRON 2 MG/ML
INJECTION INTRAMUSCULAR; INTRAVENOUS PRN
Status: DISCONTINUED | OUTPATIENT
Start: 2021-06-20 | End: 2021-06-20

## 2021-06-20 RX ORDER — OXYCODONE HYDROCHLORIDE 5 MG/1
5 TABLET ORAL EVERY 4 HOURS PRN
Status: DISCONTINUED | OUTPATIENT
Start: 2021-06-20 | End: 2021-06-20 | Stop reason: HOSPADM

## 2021-06-20 RX ORDER — NALOXONE HYDROCHLORIDE 0.4 MG/ML
0.4 INJECTION, SOLUTION INTRAMUSCULAR; INTRAVENOUS; SUBCUTANEOUS
Status: DISCONTINUED | OUTPATIENT
Start: 2021-06-20 | End: 2021-06-20 | Stop reason: HOSPADM

## 2021-06-20 RX ORDER — DEXAMETHASONE SODIUM PHOSPHATE 4 MG/ML
INJECTION, SOLUTION INTRA-ARTICULAR; INTRALESIONAL; INTRAMUSCULAR; INTRAVENOUS; SOFT TISSUE PRN
Status: DISCONTINUED | OUTPATIENT
Start: 2021-06-20 | End: 2021-06-20

## 2021-06-20 RX ORDER — SODIUM CHLORIDE, SODIUM LACTATE, POTASSIUM CHLORIDE, CALCIUM CHLORIDE 600; 310; 30; 20 MG/100ML; MG/100ML; MG/100ML; MG/100ML
INJECTION, SOLUTION INTRAVENOUS CONTINUOUS
Status: DISCONTINUED | OUTPATIENT
Start: 2021-06-20 | End: 2021-06-20 | Stop reason: HOSPADM

## 2021-06-20 RX ORDER — KETOROLAC TROMETHAMINE 30 MG/ML
INJECTION, SOLUTION INTRAMUSCULAR; INTRAVENOUS PRN
Status: DISCONTINUED | OUTPATIENT
Start: 2021-06-20 | End: 2021-06-20

## 2021-06-20 RX ORDER — ONDANSETRON 4 MG/1
4 TABLET, ORALLY DISINTEGRATING ORAL EVERY 30 MIN PRN
Status: DISCONTINUED | OUTPATIENT
Start: 2021-06-20 | End: 2021-06-20 | Stop reason: HOSPADM

## 2021-06-20 RX ORDER — PROPOFOL 10 MG/ML
INJECTION, EMULSION INTRAVENOUS PRN
Status: DISCONTINUED | OUTPATIENT
Start: 2021-06-20 | End: 2021-06-20

## 2021-06-20 RX ORDER — ONDANSETRON 4 MG/1
4 TABLET, ORALLY DISINTEGRATING ORAL
Status: DISCONTINUED | OUTPATIENT
Start: 2021-06-20 | End: 2021-06-20 | Stop reason: HOSPADM

## 2021-06-20 RX ORDER — ONDANSETRON 2 MG/ML
4 INJECTION INTRAMUSCULAR; INTRAVENOUS EVERY 30 MIN PRN
Status: DISCONTINUED | OUTPATIENT
Start: 2021-06-20 | End: 2021-06-20 | Stop reason: HOSPADM

## 2021-06-20 RX ORDER — PROPOFOL 10 MG/ML
INJECTION, EMULSION INTRAVENOUS CONTINUOUS PRN
Status: DISCONTINUED | OUTPATIENT
Start: 2021-06-20 | End: 2021-06-20

## 2021-06-20 RX ORDER — LIDOCAINE HYDROCHLORIDE 10 MG/ML
INJECTION, SOLUTION INFILTRATION; PERINEURAL PRN
Status: DISCONTINUED | OUTPATIENT
Start: 2021-06-20 | End: 2021-06-20 | Stop reason: HOSPADM

## 2021-06-20 RX ORDER — SODIUM CHLORIDE, SODIUM LACTATE, POTASSIUM CHLORIDE, CALCIUM CHLORIDE 600; 310; 30; 20 MG/100ML; MG/100ML; MG/100ML; MG/100ML
INJECTION, SOLUTION INTRAVENOUS CONTINUOUS PRN
Status: DISCONTINUED | OUTPATIENT
Start: 2021-06-20 | End: 2021-06-20

## 2021-06-20 RX ORDER — NALOXONE HYDROCHLORIDE 0.4 MG/ML
0.2 INJECTION, SOLUTION INTRAMUSCULAR; INTRAVENOUS; SUBCUTANEOUS
Status: DISCONTINUED | OUTPATIENT
Start: 2021-06-20 | End: 2021-06-20 | Stop reason: HOSPADM

## 2021-06-20 RX ORDER — FENTANYL CITRATE 50 UG/ML
INJECTION, SOLUTION INTRAMUSCULAR; INTRAVENOUS PRN
Status: DISCONTINUED | OUTPATIENT
Start: 2021-06-20 | End: 2021-06-20

## 2021-06-20 RX ORDER — FENTANYL CITRATE 50 UG/ML
25-50 INJECTION, SOLUTION INTRAMUSCULAR; INTRAVENOUS
Status: DISCONTINUED | OUTPATIENT
Start: 2021-06-20 | End: 2021-06-20 | Stop reason: HOSPADM

## 2021-06-20 RX ORDER — OXYCODONE HYDROCHLORIDE 5 MG/1
5 TABLET ORAL
Status: DISCONTINUED | OUTPATIENT
Start: 2021-06-20 | End: 2021-06-20 | Stop reason: HOSPADM

## 2021-06-20 RX ORDER — DOXYCYCLINE 100 MG/1
200 CAPSULE ORAL
Status: COMPLETED | OUTPATIENT
Start: 2021-06-20 | End: 2021-06-20

## 2021-06-20 RX ORDER — ACETAMINOPHEN 325 MG/1
975 TABLET ORAL ONCE
Status: COMPLETED | OUTPATIENT
Start: 2021-06-20 | End: 2021-06-20

## 2021-06-20 RX ADMIN — PROPOFOL 100 MCG/KG/MIN: 10 INJECTION, EMULSION INTRAVENOUS at 12:20

## 2021-06-20 RX ADMIN — DOXYCYCLINE HYCLATE 200 MG: 100 CAPSULE, GELATIN COATED ORAL at 09:40

## 2021-06-20 RX ADMIN — DEXAMETHASONE SODIUM PHOSPHATE 4 MG: 4 INJECTION, SOLUTION INTRAMUSCULAR; INTRAVENOUS at 12:24

## 2021-06-20 RX ADMIN — ONDANSETRON 4 MG: 2 INJECTION INTRAMUSCULAR; INTRAVENOUS at 12:34

## 2021-06-20 RX ADMIN — FENTANYL CITRATE 50 MCG: 50 INJECTION, SOLUTION INTRAMUSCULAR; INTRAVENOUS at 12:31

## 2021-06-20 RX ADMIN — PROPOFOL 90 MG: 10 INJECTION, EMULSION INTRAVENOUS at 12:20

## 2021-06-20 RX ADMIN — SODIUM CHLORIDE, POTASSIUM CHLORIDE, SODIUM LACTATE AND CALCIUM CHLORIDE: 600; 310; 30; 20 INJECTION, SOLUTION INTRAVENOUS at 12:11

## 2021-06-20 RX ADMIN — MIDAZOLAM 2 MG: 1 INJECTION INTRAMUSCULAR; INTRAVENOUS at 12:04

## 2021-06-20 RX ADMIN — KETOROLAC TROMETHAMINE 15 MG: 30 INJECTION, SOLUTION INTRAMUSCULAR at 12:48

## 2021-06-20 RX ADMIN — ACETAMINOPHEN 975 MG: 325 TABLET, FILM COATED ORAL at 09:37

## 2021-06-20 RX ADMIN — MIDAZOLAM 2 MG: 1 INJECTION INTRAMUSCULAR; INTRAVENOUS at 12:13

## 2021-06-20 NOTE — OP NOTE
GYN Operative Report    Kayla Loya  [id number]  1990    Date of Procedure: 2021  Preoperative Diagnosis: intrauterine fetal demise measuring 14w2d  Postoperative Diagnosis: incomplete miscarriage, fetus previously delivered.  Procedure: Dilation and curettage   Surgeon: Ksenia Manning MD    Anesthesia: MAC  Anesthesiologist: Kaylee Haque cRNA and Carrillo Irwin DO  Complications: None, although unexpected finding that fetal remains had passed prior to procedure  EBL: 10 mL.  IVF: 700 mL LR.  UOP: bladder not drained    Pathology: Products of conception to pathology.    Findings: 10 week size mid position uterus on bimanual exam, cervix 1cm dilated.  No adnexal masses appreciated    Indications: Kayla Loya is a 31 year old  with a missed  at 17w0d, measuring 14w/d on US on 21.  She presented to outside hospital on 21 for medical induction.  She was given multiple doses of misoprostol throughout that admission.  Outside provider reached out to provider here for D&E, as induction thought to be ineffective.  Patient was discharged from that facility 21 and presented today for D&E.  Reviewed risks of excessive bleeding, infection, uterine perforation, cervical laceration, Asherman's syndrome,incomplete procedure, injury to other organs.    Procedure:    The patient was taken to the OR where she was induced under MAC. She was prepped and draped in the normal sterile fashion in low lithotomy position. A medium graves speculum was placed with good visualization of the cervix. The cervix grasped with a single tooth tenaculum. Lidocaine 1% was injected to achieve a paracervical block, 10mL at each 4 and 8 o'clock. The cervix was gently dilated using the Heger dilators to 39 [place].    The 13mm curved suction cannula was placed without difficulty.  Two passes were made with scant return of tissue, and no significant tissue appreciated.  Forceps  introduced into cavity and again, no tissue appreciated.  Intraoperative ultrasound performed and didn't reveal fetus in uterus.  Ultrasound images from 6/13/21 were reviewed and confirmed that ultrasound images today did not show what was previously seen.  A final pass was made with 9mm curved suction cannula, which  was rotated until a gritty texture was appreciated circumferentially.  All instruments were removed from the uterus. The tenaculum was removed, and noted to be good hemostatic at the puncture sites and pressure applied. The speculum was removed from the vagina.    Sponge, lap, instrument, and needle counts were correct x 2.     The patient tolerated the procedure well. She was woken up and transported to the PACU in stable condition.    Formal ultrasound was performed post-operatively and confirmed no products of conception remain.    Ksenia Manning MD

## 2021-06-20 NOTE — ANESTHESIA POSTPROCEDURE EVALUATION
Patient: Kayla Loya    Procedure(s):  Dilation and curettage suction    Diagnosis:Fetal demise before 20 weeks with retention of dead fetus [O02.1]  Diagnosis Additional Information: No value filed.    Anesthesia Type:  General    Note:     Postop Pain Control: Uneventful            Sign Out: Well controlled pain   PONV: No   Neuro/Psych: Uneventful            Sign Out: Acceptable/Baseline neuro status   Airway/Respiratory: Uneventful            Sign Out: Acceptable/Baseline resp. status   CV/Hemodynamics: Uneventful            Sign Out: Acceptable CV status; No obvious hypovolemia; No obvious fluid overload   Other NRE: NONE   DID A NON-ROUTINE EVENT OCCUR?            Last vitals:  Vitals:    06/20/21 1330 06/20/21 1345 06/20/21 1400   BP: 108/70 98/74 100/66   Pulse: 85 104 100   Resp: 16 18 16   Temp:   36.9  C (98.4  F)   SpO2:  99% 99%       Last vitals prior to Anesthesia Care Transfer:  CRNA VITALS  6/20/2021 1225 - 6/20/2021 1325      6/20/2021             NIBP:  92/66    Pulse:  90    NIBP Mean:  76    Temp:  36.8  C (98.2  F)    SpO2:  98 %    Resp Rate (observed):  12          Electronically Signed By: Carrillo Irwin DO  June 20, 2021  3:42 PM

## 2021-06-20 NOTE — DISCHARGE INSTRUCTIONS
Discharge Instructions: Following a Dilation   and Curettage/Dilation and Evacuation    What to expect:    Expect small to moderate amount of vaginal bleeding which should taper off in 4-5 days. It should not be heavier than your regular menstrual flow.    Do not douche, and use a pad rather than tampons.     No intercourse until bleeding has ceased.    Activity:    Rest the day of surgery. You may resume normal activity the next day.    You may bathe or shower.    Avoid heavy lifting (10-15 lbs) for one week.    Comfort:    The amount of discomfort you can expect is very unpredictable. If you have pain that cannot be controlled with non-aspirin pain relievers or with the prescription you may have received, you should notify your doctor.    Abdominal cramping (like menstrual cramps) or low back ache are common and should not be a cause for concern. You will be drowsy and weak the day of surgery and possibly the following day.    Diet:    You have no restrictions on your diet. Following surgery, drink plenty of fluids and eat a light meal.    Nausea:    The anesthesia medications you received during your surgical procedure may produce some nausea.    If you feel nauseated, stay in bed, keep your head down and try drinking fluids such as Seven-Up, tea or soup.    Notify Physician at once if you experience:    A fever over 100.4 degrees (a low grade fever under 100 degrees is usual after surgery).    Heavy flow and/or passing large clots. Saturating more than 1 pad per hour for 2 or more hours.     Severe pain or cramps.    Important numbers  [facility] ([address]) - [place]: [phone]   [facility] ([address]) : [phone]  Rev. 5/12    Same-Day Surgery   Adult Discharge Orders & Instructions     For 24 hours after surgery:  1. Get plenty of rest.  A responsible adult must stay with you for at least 24 hours after you leave the hospital.   2. Pain medication can  slow your reflexes. Do not drive or use heavy equipment.  If you have weakness or tingling, don't drive or use heavy equipment until this feeling goes away.  3. Mixing alcohol and pain medication can cause dizziness and slow your breathing. It can even be fatal. Do not drink alcohol while taking pain medication.  4. Avoid strenuous or risky activities.  Ask for help when climbing stairs.   5. You may feel lightheaded.  If so, sit for a few minutes before standing.  Have someone help you get up.   6. If you have nausea (feel sick to your stomach), drink only clear liquids such as apple juice, ginger ale, broth or 7-Up.  Rest may also help.  Be sure to drink enough fluids.  Move to a regular diet as you feel able. Take pain medications with a small amount of solid food, such as toast or crackers, to avoid nausea.   7. A slight fever is normal. Call the doctor if your fever is over 100 F (37.7 C) (taken under the tongue) or lasts longer than 24 hours.  8. You may have a dry mouth, muscle aches, trouble sleeping or a sore throat.  These symptoms should go away after 24 hours.  9. Do not make important or legal decisions.   Pain Management:      1. Take pain medication (if prescribed) for pain as directed by your physician.        2. WARNING: If the pain medication you have been prescribed contains Tylenol  (acetaminophen), DO NOT take additional doses of Tylenol (acetaminophen).     Call your doctor for any of the followin.  Signs of infection (fever, growing tenderness at the surgery site, severe pain, a large amount of drainage or bleeding, foul-smelling drainage, redness, swelling).    2.  It has been over 8 to 10 hours since surgery and you are still not able to urinate (pee).    3.  Headache for over 24 hours.    4.  Numbness, tingling or weakness the day after surgery (if you had spinal anesthesia).  To contact a doctor, call _____________________________________ or:      [phone] and ask for the Resident  On Call for:          __________________________________________ (answered 24 hours a day)      Emergency Department:  [place] [facility]: [phone]  [place] [facility]: [phone]               Rev. 10/2014

## 2021-06-20 NOTE — PROGRESS NOTES
Social Work On Call Note    On call SW was paged to discuss disposition of baby after the procedure.  SW spoke with patient and discussed options with patient who states she would like individual cremation.  SW discussed details and process of this.  She gave permission for SW to call to help coordinate.  SW called [employer] [place] ([phone]) who will reach out to patient tomorrow regarding details.  Patient appeared tearful over the phone, however she states she has a strong support system at home including her  and brother in laws.  SW discussed additional supports available.    Sarah Brunner, MSW LICSW  6/20/2021  On call Pager [phone]

## 2021-06-20 NOTE — PROVIDER NOTIFICATION
"   06/19/21 2105   Provider Notification   Provider Name/Title Dr. Menendez   Method of Notification At Bedside   Request Evaluate in Person   Notification Reason Status Update     Dr. Menendez informed Kayla that after consulting several specialists, he feels the best POC to deliver her baby via scheduled D&C/D&C at [facility] tomorrow morning. Option offered to discharge home tonight and go to [facility] tomorrow, or stay overnight for rest and transfer in the AM. Patient elects to discharge home now. She is tearful and \"feels like this is a lot\" but is agreeable to POC and \"would like to see her kids tonight.\"  Trent on the phone and supportive. MD will enter orders.   "

## 2021-06-20 NOTE — ANESTHESIA CARE TRANSFER NOTE
Patient: Kayla Loya    Procedure(s):  Dilation and curettage suction    Diagnosis: Fetal demise before 20 weeks with retention of dead fetus [O02.1]  Diagnosis Additional Information: No value filed.    Anesthesia Type:   General     Note:    Oropharynx: oropharynx clear of all foreign objects and spontaneously breathing  Level of Consciousness: awake  Oxygen Supplementation: face mask  Level of Supplemental Oxygen (L/min / FiO2): 8  Independent Airway: airway patency satisfactory and stable  Dentition: dentition unchanged  Vital Signs Stable: post-procedure vital signs reviewed and stable  Report to RN Given: handoff report given  Patient transferred to: Phase II  Comments: Patient transferred to Phase II on 8 LPM O2 via CFM. VSS upon arrival, respirations WNL. Patient transitioned uneventfully to RA. Report to RN.    Kaylee Haque CRNA at 1:02 PM on June 20, 2021      Handoff Report: Identifed the Patient, Identified the Reponsible Provider, Reviewed the pertinent medical history, Discussed the surgical course, Reviewed Intra-OP anesthesia mangement and issues during anesthesia, Set expectations for post-procedure period and Allowed opportunity for questions and acknowledgement of understanding      Vitals: (Last set prior to Anesthesia Care Transfer)  CRNA VITALS  6/20/2021 1225 - 6/20/2021 1302      6/20/2021             NIBP:  92/66    Pulse:  90    NIBP Mean:  76    Temp:  36.8  C (98.2  F)    SpO2:  98 %    Resp Rate (observed):  12        Electronically Signed By: YOANA Dye CRNA  June 20, 2021  1:02 PM

## 2021-06-20 NOTE — PROGRESS NOTES
Despite repeated dosages of Misoprostol the pt has failed to make progress toward delivery.  I spoke with MFM as well as general OB GYN at [place] and reviewed the findings and the risks benefits and alternative modes of Rx and a decision was made to transfer the pt to [place] for a surgical evacuation of the uterus 6/20/21 am.  Risks, benefits, and alternative modes of therapy discussed at length. Pathophysiology of the disease process reviewed, all of the patients questions answered and informed consent obtained.  The pt is blood group A Rh Neg  Antibody pos (antiD felt related to the Rhogam that the pt received on 6/13/21)  The pt desires to be discharged tonight and will report to [place] in the am.  Pre op instructions reviewed  I spoke with the pt and her  and answered all their questions and provided emotional support.  Pt si scheduled for 6/20/21 at 8:30 am  Pt counciled as to the sx of concern  She will report to ER at [place] should these occur  Gualberto Menendez M.D.

## 2021-06-20 NOTE — DISCHARGE INSTRUCTIONS
Discharge Instruction for Undelivered Patients      You were seen for: Induction of labor for IUFD (intrauterine fetal demise)  We Consulted: Dr. Simms, Dr. Menendez  You had (Test or Medicine): Oral cytotec, uterine monitoring, cervical exams      Diet:   Do not eat any solid foods after midnight to prepare for your procedure.  You may drink water up to 4 hours before your procedure.    Activity:  Rest and relax tonight as much as possible.     Call your provider if you notice:  Any increased vaginal bleeding, increased cramping/uterine pain, or passing blood clots    Follow-up:  Tomorrow morning for your scheduled D&C delivery at [facility]   You may check in via the emergency department; they will be expecting you

## 2021-06-20 NOTE — PLAN OF CARE
Kayla states is ready for discharge. VSS, IV removed, discharge instructions and pre-op instructions for tomorrow's procedure reviewed. At time of discharge, patient reports occasional cramping but no pain. Scant amount of dark red bleeding noted on pad, no clots present. Patient understands that she should report to [place] ED for increased pain, bleeding, cramping overnight. If stable, she should report to [place] at 0830 for scheduled D&E/D&C. She feels all her questions have been answered at this time.    Patient discharged from unit at 2210. Memory box and baby mementos sent home with her.  Trent present to drive her home.

## 2021-06-20 NOTE — ANESTHESIA PREPROCEDURE EVALUATION
"Anesthesia Pre-Procedure Evaluation    Patient: Kayla Loya   MRN: [id number] : 1990        Preoperative Diagnosis: Fetal demise before 20 weeks with retention of dead fetus [O02.1]   Procedure : Procedure(s):  DILATION AND EVACUATION, UTERUS, with ultrasound     Past Medical History:   Diagnosis Date     Seizures in the      At 10 months      Past Surgical History:   Procedure Laterality Date     NO HISTORY OF SURGERY        Allergies   Allergen Reactions     Sulfa Drugs      \"This happened when I was a baby and never since.\"      Social History     Tobacco Use     Smoking status: Former Smoker     Quit date:      Years since quittin.4     Smokeless tobacco: Never Used   Substance Use Topics     Alcohol use: Not Currently      Wt Readings from Last 1 Encounters:   21 83 kg (182 lb 15.7 oz)           Physical Exam    Airway        Mallampati: II   TM distance: > 3 FB   Neck ROM: full   Mouth opening: > 3 cm    Respiratory Devices and Support         Dental         B=Bridge, C=Chipped, L=Loose, M=Missing    Cardiovascular          Rhythm and rate: normal     Pulmonary   pulmonary exam normal                OUTSIDE LABS:  CBC:   Lab Results   Component Value Date    WBC 7.7 2021    HGB 13.5 2021    HCT 41.2 2021     2021     BMP: No results found for: NA, POTASSIUM, CHLORIDE, CO2, BUN, CR, GLC  COAGS:   Lab Results   Component Value Date    FIBR 266 2021     POC: No results found for: BGM, HCG, HCGS  HEPATIC: No results found for: ALBUMIN, PROTTOTAL, ALT, AST, GGT, ALKPHOS, BILITOTAL, BILIDIRECT, KATHIE  OTHER: No results found for: PH, LACT, A1C, NINA, PHOS, MAG, LIPASE, AMYLASE, TSH, T4, T3, CRP, SED    Anesthesia Plan    ASA Status:  2      Anesthesia Type: General.     - Airway: ETT   Induction: Intravenous.   Maintenance: Balanced.        Consents         - Extended Intubation/Ventilatory Support Discussed: No.      - Patient is DNR/DNI Status: " No    Use of blood products discussed: No .     Postoperative Care            Comments:                Carrillo Irwin DO

## 2021-06-23 LAB
COPATH REPORT: NORMAL
COPATH REPORT: NORMAL

## 2021-06-27 ENCOUNTER — HEALTH MAINTENANCE LETTER (OUTPATIENT)
Age: 31
End: 2021-06-27

## 2021-06-29 ENCOUNTER — OFFICE VISIT (OUTPATIENT)
Dept: OBGYN | Facility: CLINIC | Age: 31
End: 2021-06-29

## 2021-06-29 VITALS
HEIGHT: 63 IN | SYSTOLIC BLOOD PRESSURE: 116 MMHG | DIASTOLIC BLOOD PRESSURE: 80 MMHG | BODY MASS INDEX: 33.31 KG/M2 | WEIGHT: 188 LBS

## 2021-06-29 DIAGNOSIS — O02.1 IUFD AT LESS THAN 20 WEEKS OF GESTATION: Primary | ICD-10-CM

## 2021-06-29 PROCEDURE — 99024 POSTOP FOLLOW-UP VISIT: CPT | Performed by: OBSTETRICS & GYNECOLOGY

## 2021-06-29 ASSESSMENT — ANXIETY QUESTIONNAIRES
5. BEING SO RESTLESS THAT IT IS HARD TO SIT STILL: NOT AT ALL
GAD7 TOTAL SCORE: 0
1. FEELING NERVOUS, ANXIOUS, OR ON EDGE: NOT AT ALL
3. WORRYING TOO MUCH ABOUT DIFFERENT THINGS: NOT AT ALL
6. BECOMING EASILY ANNOYED OR IRRITABLE: NOT AT ALL
2. NOT BEING ABLE TO STOP OR CONTROL WORRYING: NOT AT ALL
7. FEELING AFRAID AS IF SOMETHING AWFUL MIGHT HAPPEN: NOT AT ALL
IF YOU CHECKED OFF ANY PROBLEMS ON THIS QUESTIONNAIRE, HOW DIFFICULT HAVE THESE PROBLEMS MADE IT FOR YOU TO DO YOUR WORK, TAKE CARE OF THINGS AT HOME, OR GET ALONG WITH OTHER PEOPLE: NOT DIFFICULT AT ALL

## 2021-06-29 ASSESSMENT — MIFFLIN-ST. JEOR: SCORE: 1531.89

## 2021-06-29 ASSESSMENT — PATIENT HEALTH QUESTIONNAIRE - PHQ9
SUM OF ALL RESPONSES TO PHQ QUESTIONS 1-9: 0
5. POOR APPETITE OR OVEREATING: NOT AT ALL

## 2021-06-29 NOTE — NURSING NOTE
"Chief Complaint   Patient presents with     Follow Up     D&C        Initial /80 (BP Location: Right arm, Patient Position: Chair, Cuff Size: Adult Large)   Ht 1.6 m (5' 3\")   Wt 85.3 kg (188 lb)   LMP 2021 (Exact Date)   Breastfeeding No   BMI 33.30 kg/m   Estimated body mass index is 33.3 kg/m  as calculated from the following:    Height as of this encounter: 1.6 m (5' 3\").    Weight as of this encounter: 85.3 kg (188 lb).  BP completed using cuff size: regular    Questioned patient about current smoking habits.  Pt. has never smoked.          The following HM Due: NONE    Lynnette Cuevas CMA    "

## 2021-06-29 NOTE — PROGRESS NOTES
"  SUBJECTIVE:                                                   CC:  Patient presents with:  Follow Up: D&C       HPI:  Kayla Aguilar is a 31 year old  who presents for follow-up from [facility].    Diagnosed with incomplete/inevatable AB at 14w2d on 21.  Admitted for misoprostol induction, unsuccessful after 24h, was discharged home and scheduled for D&E the following day.  When she presented for D&E there were no POC and an ultrasound showed an empty uterus.  She thinks it must have expelled sometime during the induction after all, but she didn't notice.  Since then, has had some occasional VB but nothing heavy.  It was a surprise, unplanned pregnancy, and she is not planning any more pregnancies at this point.  Her  has already discussed vasectomy.  She declines a work up for APLAS/RPL.    OB History:  P1-   at 41 wks  P2-  SAB at 10+5, s/p misoprostol  P3- 2019  at 38+5  P4-  SAB 14+2     OBJECTIVE:     /80 (BP Location: Right arm, Patient Position: Chair, Cuff Size: Adult Large)   Ht 1.6 m (5' 3\")   Wt 85.3 kg (188 lb)   LMP 2021 (Exact Date)   Breastfeeding No   BMI 33.30 kg/m      Gen: Healthy appearing female, no acute distress, comfortable  Psych: mentation appears normal and affect mildly flat/sad  : deferred    Test Results:  Patient Name: KAYLA AGUILAR   MR#: [id number]   Specimen #: [id number]   Collected: 2021   Received: 2021   Reported: 2021 19:31   Ordering Phy(s): ARTIS SMITH     For improved result formatting, select 'View Enhanced Report Format' under    Linked Documents section.     SPECIMEN(S):   Products of conception     FINAL DIAGNOSIS:     Products of conception, dilatation and curettage:        - Fragments of blood clot, endometrium, abundant myometrium,   implantation site,       and rare atrophic chorionic villi.         PHQ9 today:  Score 0    ASSESSMENT/PLAN:                                               "        1. IUFD at less than 20 weeks of gestation  Reviewed pregnancy course.  Reviewed options for testing for APLAS, pt declines at this time as she doesn't plan any future pregnancies.  Reviewed options for contraception including LARC methods.  Reviewed risk of postpartum depression.  Recommend follow-up in 1 month for pap smear and possible LARC placement.     Kylah Brantley MD, MPH  Obstetrics and Gynecology

## 2021-06-30 ASSESSMENT — ANXIETY QUESTIONNAIRES: GAD7 TOTAL SCORE: 0

## 2021-10-17 ENCOUNTER — HEALTH MAINTENANCE LETTER (OUTPATIENT)
Age: 31
End: 2021-10-17

## 2022-07-24 ENCOUNTER — HEALTH MAINTENANCE LETTER (OUTPATIENT)
Age: 32
End: 2022-07-24

## 2022-10-03 ENCOUNTER — HEALTH MAINTENANCE LETTER (OUTPATIENT)
Age: 32
End: 2022-10-03

## 2023-08-12 ENCOUNTER — HEALTH MAINTENANCE LETTER (OUTPATIENT)
Age: 33
End: 2023-08-12

## (undated) DEVICE — LINEN TOWEL PACK X5 5464

## (undated) DEVICE — LINEN GOWN X4 5410

## (undated) DEVICE — STRAP KNEE/BODY 31143004

## (undated) DEVICE — Device

## (undated) DEVICE — GLOVE PROTEXIS BLUE W/NEU-THERA 7.0  2D73EB70

## (undated) DEVICE — SUCTION VACUUM CANISTER STANDARD W/LID&CAPS 003987-901

## (undated) DEVICE — TUBING SUCTION VACUUM COLLECTION 6FT 610

## (undated) DEVICE — GLOVE PROTEXIS W/NEU-THERA 6.5  2D73TE65

## (undated) DEVICE — SUCTION CANNULA UTERINE 09MM CVD 022109-10

## (undated) DEVICE — COVER PROBE ULTRASOUND 3D W/GEL 5X96" LF 20-P3D596

## (undated) DEVICE — TUBING VACUUM COLLECTION SET LG 1/2"X6' BKT-506

## (undated) DEVICE — PAD CHUX UNDERPAD 30X36" P3036C

## (undated) DEVICE — SOL NACL 0.9% IRRIG 1000ML BOTTLE 2F7124

## (undated) DEVICE — SOL WATER IRRIG 1000ML BOTTLE 2F7114

## (undated) DEVICE — PEN MARKING SKIN W/LABELS 31145918

## (undated) RX ORDER — DOXYCYCLINE 100 MG/1
CAPSULE ORAL
Status: DISPENSED
Start: 2021-06-20

## (undated) RX ORDER — LIDOCAINE HYDROCHLORIDE 10 MG/ML
INJECTION, SOLUTION EPIDURAL; INFILTRATION; INTRACAUDAL; PERINEURAL
Status: DISPENSED
Start: 2021-06-20

## (undated) RX ORDER — MISOPROSTOL 200 UG/1
TABLET ORAL
Status: DISPENSED
Start: 2021-06-20

## (undated) RX ORDER — CARBOPROST TROMETHAMINE 250 UG/ML
INJECTION, SOLUTION INTRAMUSCULAR
Status: DISPENSED
Start: 2021-06-20

## (undated) RX ORDER — OXYTOCIN 10 [USP'U]/ML
INJECTION, SOLUTION INTRAMUSCULAR; INTRAVENOUS
Status: DISPENSED
Start: 2021-06-20

## (undated) RX ORDER — LIDOCAINE HYDROCHLORIDE AND EPINEPHRINE 10; 10 MG/ML; UG/ML
INJECTION, SOLUTION INFILTRATION; PERINEURAL
Status: DISPENSED
Start: 2021-06-20

## (undated) RX ORDER — FENTANYL CITRATE 50 UG/ML
INJECTION, SOLUTION INTRAMUSCULAR; INTRAVENOUS
Status: DISPENSED
Start: 2021-06-20

## (undated) RX ORDER — METHYLERGONOVINE MALEATE 0.2 MG/ML
INJECTION INTRAVENOUS
Status: DISPENSED
Start: 2021-06-20

## (undated) RX ORDER — ACETAMINOPHEN 325 MG/1
TABLET ORAL
Status: DISPENSED
Start: 2021-06-20

## (undated) RX ORDER — DEXAMETHASONE SODIUM PHOSPHATE 4 MG/ML
INJECTION, SOLUTION INTRA-ARTICULAR; INTRALESIONAL; INTRAMUSCULAR; INTRAVENOUS; SOFT TISSUE
Status: DISPENSED
Start: 2021-06-20

## (undated) RX ORDER — KETOROLAC TROMETHAMINE 30 MG/ML
INJECTION, SOLUTION INTRAMUSCULAR; INTRAVENOUS
Status: DISPENSED
Start: 2021-06-20